# Patient Record
Sex: FEMALE | Race: WHITE | NOT HISPANIC OR LATINO | Employment: STUDENT | URBAN - NONMETROPOLITAN AREA
[De-identification: names, ages, dates, MRNs, and addresses within clinical notes are randomized per-mention and may not be internally consistent; named-entity substitution may affect disease eponyms.]

---
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
## 2023-10-31 ENCOUNTER — OFFICE VISIT (OUTPATIENT)
Dept: FAMILY MEDICINE CLINIC | Facility: CLINIC | Age: 23
End: 2023-10-31
Payer: MEDICAID

## 2023-10-31 VITALS
WEIGHT: 128.2 LBS | DIASTOLIC BLOOD PRESSURE: 68 MMHG | HEIGHT: 63 IN | SYSTOLIC BLOOD PRESSURE: 116 MMHG | HEART RATE: 71 BPM | BODY MASS INDEX: 22.71 KG/M2 | OXYGEN SATURATION: 95 %

## 2023-10-31 DIAGNOSIS — F39 MOOD DISORDER: ICD-10-CM

## 2023-10-31 DIAGNOSIS — F60.3 BORDERLINE PERSONALITY DISORDER: Primary | ICD-10-CM

## 2023-10-31 PROCEDURE — 1159F MED LIST DOCD IN RCRD: CPT | Performed by: INTERNAL MEDICINE

## 2023-10-31 PROCEDURE — 99204 OFFICE O/P NEW MOD 45 MIN: CPT | Performed by: INTERNAL MEDICINE

## 2023-10-31 PROCEDURE — 1160F RVW MEDS BY RX/DR IN RCRD: CPT | Performed by: INTERNAL MEDICINE

## 2023-10-31 RX ORDER — ARIPIPRAZOLE 10 MG/1
10 TABLET ORAL DAILY
Qty: 30 TABLET | Refills: 0 | Status: SHIPPED | OUTPATIENT
Start: 2023-10-31

## 2023-10-31 NOTE — PROGRESS NOTES
Chief Complaint   Patient presents with    new patient preventative medicine service    Depression    boderline personality disorder        HPI:  Emma Lee is a 23 y.o. female who presents today for establish care.  History of depression and borderline personality disorder.  She is interested in establishing care with psychiatry and starting on a medication.  Patient reports alternating episodes of depression and alberto.  Denies any suicidal ideation.    ROS:  Constitutional: no fevers, night sweats or unexplained weight loss  Eyes: no vision changes  ENT: no runny nose, ear pain, sore throat  Cardio: no chest pain, palpitations  Pulm: no shortness of breath, wheezing, or cough  GI: no abdominal pain or changes in bowel movements  : no difficulty urinating  MSK: no difficulty ambulating, no joint pain  Neuro: no weakness, dizziness or headache  Psych: no trouble sleeping  Endo: no change in appetite      Past Medical History:   Diagnosis Date    Anemia     Anxiety     Substance abuse     per mother      Family History   Problem Relation Age of Onset    Drug abuse Father     Drug abuse Maternal Uncle     Alcohol abuse Maternal Uncle       Social History     Socioeconomic History    Marital status: Single   Tobacco Use    Smoking status: Never    Smokeless tobacco: Never   Substance and Sexual Activity    Alcohol use: No    Drug use: No     Comment: pt denies, but 2 wks ago she tested positive for suboxone; also uses something called dabs, but mom says pt has been taking a pill to dilute the drugs in her system    Sexual activity: Yes     Comment: states she is bisexual      No Known Allergies   Immunization History   Administered Date(s) Administered    DTaP 2001    Hep B, Adolescent or Pediatric 2001, 2001    Hib (PRP-OMP) 2001    IPV 2001    PEDS-Pneumococcal Conjugate (PCV7) 2001        PE:  Vitals:    10/31/23 0832   BP: 116/68   Pulse: 71   SpO2: 95%      Body mass  index is 22.71 kg/m².    Gen Appearance: NAD  HEENT: Normocephalic, PERRLA, no thyromegaly, trache midline  Heart: RRR, normal S1 and S2, no murmur  Lungs: CTA b/l, no wheezing, no crackles  Abdomen: Soft, non-tender, non-distended, no guarding and BSx4  MSK: Moves all extremities well, normal gait, no peripheral edema  Pulses: Palpable and equal b/l  Lymph nodes: No palpable lymphadenopathy   Neuro: No focal deficits      Current Outpatient Medications   Medication Sig Dispense Refill    ARIPiprazole (Abilify) 10 MG tablet Take 1 tablet by mouth Daily. 30 tablet 0     No current facility-administered medications for this visit.      Patient is interested in starting on a bipolar medication.  Her most concerning symptom is range of emotions including depression to alberto.  She has tried Zoloft in the past which was not effective.  Trial of Abilify.  Refer to behavioral health to establish care.    Diagnoses and all orders for this visit:    1. Borderline personality disorder (Primary)    2. Mood disorder  -     ARIPiprazole (Abilify) 10 MG tablet; Take 1 tablet by mouth Daily.  Dispense: 30 tablet; Refill: 0         Return in about 3 months (around 1/31/2024) for Annual.     Dictated Utilizing Dragon Dictation    Please note that portions of this note were completed with a voice recognition program.    Part of this note may be an electronic transcription/translation of spoken language to printed text using the Dragon Dictation System.

## 2023-11-02 ENCOUNTER — PATIENT ROUNDING (BHMG ONLY) (OUTPATIENT)
Dept: FAMILY MEDICINE CLINIC | Facility: CLINIC | Age: 23
End: 2023-11-02
Payer: MEDICAID

## 2023-11-22 ENCOUNTER — TELEPHONE (OUTPATIENT)
Dept: FAMILY MEDICINE CLINIC | Facility: CLINIC | Age: 23
End: 2023-11-22
Payer: MEDICAID

## 2023-11-22 NOTE — TELEPHONE ENCOUNTER
Kacie Aguirre, DO  You11 minutes ago (1:32 PM)       She can stop taking the abilify if it is causing fatigue. Would recommend she follow up with PCP next week to discuss alternative therapies. If mood symptoms acutely worsen over the weekend she can go to [facility] or [facility].

## 2023-11-22 NOTE — TELEPHONE ENCOUNTER
I informed patient of recommendations per Dr. Aguirre. She verbalized understanding and requested for her follow up to be with a female provider. She would like to go ahead and switch PCP. She did not have a preference as to whom in the office. I did get her scheduled with Dr. Schaffer but not until 12/7/2023.   Is this okay to switch?

## 2023-11-22 NOTE — TELEPHONE ENCOUNTER
Caller: Emma Lee    Relationship: Self    Best call back number: [phone]    Which medication are you concerned about:   ARIPiprazole (Abilify) 10 MG tablet  10 mg, Daily     Who prescribed you this medication: [facility]    When did you start taking this medication: 11/4/2023    What are your concerns: THIS MED MAKES HER FEEL VERY TIRED AND DEPRESSED. NEEDS TO SEE IF AN ANTI DEPRESSION MED CAN BE CALLED IN WITH THIS OR SHOULD IT BE COMPLETELY CHANGED?    How long have you had these concerns: PAST WEEK

## 2024-01-05 ENCOUNTER — TELEMEDICINE (OUTPATIENT)
Dept: FAMILY MEDICINE CLINIC | Facility: CLINIC | Age: 24
End: 2024-01-05
Payer: MEDICAID

## 2024-01-05 DIAGNOSIS — R45.89 SYMPTOMS OF DEPRESSION: Primary | ICD-10-CM

## 2024-01-05 PROCEDURE — 1159F MED LIST DOCD IN RCRD: CPT | Performed by: INTERNAL MEDICINE

## 2024-01-05 PROCEDURE — 99213 OFFICE O/P EST LOW 20 MIN: CPT | Performed by: INTERNAL MEDICINE

## 2024-01-05 PROCEDURE — 1160F RVW MEDS BY RX/DR IN RCRD: CPT | Performed by: INTERNAL MEDICINE

## 2024-01-05 NOTE — LETTER
January 5, 2024       No Recipients    Patient: Emma Lee   YOB: 2000   Date of Visit: 1/5/2024     To whom it may concern,       Emma Lee was recently prescribed Abilify for depression symptoms.  She tried medication for 2 weeks and then discontinued.  Currently stable and doing well without medication.         Sincerely,        JOANIE Camarena, DO

## 2024-01-05 NOTE — PROGRESS NOTES
Cc: med review  You have chosen to receive care through a telehealth visit.  Do you consent to use a video/audio connection for your medical care today? Yes  Patient located at home  Provider located at office    HPI:  Emma Lee is a 23 y.o. female who presents today for follow-up anxiety.  She had several side effects to Abilify and only took medication for 1 to 2 weeks.  Symptoms are now stable off of medication.  Requesting a letter for .    ROS:  Constitutional: no fevers, night sweats or unexplained weight loss  Eyes: no vision changes  ENT: no runny nose, ear pain, sore throat  Cardio: no chest pain, palpitations  Pulm: no shortness of breath, wheezing, or cough  GI: no abdominal pain or changes in bowel movements  : no difficulty urinating  MSK: no difficulty ambulating, no joint pain  Neuro: no weakness, dizziness or headache  Psych: no trouble sleeping  Endo: no change in appetite      Past Medical History:   Diagnosis Date    Anemia     Anxiety     Substance abuse     per mother      Family History   Problem Relation Age of Onset    Drug abuse Father     Drug abuse Maternal Uncle     Alcohol abuse Maternal Uncle       Social History     Socioeconomic History    Marital status: Single   Tobacco Use    Smoking status: Never    Smokeless tobacco: Never   Substance and Sexual Activity    Alcohol use: No    Drug use: No     Comment: pt denies, but 2 wks ago she tested positive for suboxone; also uses something called dabs, but mom says pt has been taking a pill to dilute the drugs in her system    Sexual activity: Yes     Comment: states she is bisexual      No Known Allergies   Immunization History   Administered Date(s) Administered    DTaP 2001    Hep B, Adolescent or Pediatric 2001, 2001    Hib (PRP-OMP) 2001    IPV 2001    PEDS-Pneumococcal Conjugate (PCV7) 2001        PE:  There were no vitals filed for this visit.   There is no height or weight on file  to calculate BMI.    Gen Appearance: NAD        No current outpatient medications on file.     No current facility-administered medications for this visit.      Patient currently doing well off of medication, continue to monitor.  Call or return to clinic as needed.  Provided letter for .    Counseling was given to patient for the following topics: instructions for management, impressions, and risks and benefits of treatment options . Total time of the encounter was 20 minutes and 10 minutes was spent face to face counseling.      Diagnoses and all orders for this visit:    1. Symptoms of depression (Primary)         No follow-ups on file.     Dictated Utilizing Dragon Dictation    Please note that portions of this note were completed with a voice recognition program.    Part of this note may be an electronic transcription/translation of spoken language to printed text using the Dragon Dictation System.